# Patient Record
Sex: MALE | Race: WHITE | NOT HISPANIC OR LATINO | URBAN - METROPOLITAN AREA
[De-identification: names, ages, dates, MRNs, and addresses within clinical notes are randomized per-mention and may not be internally consistent; named-entity substitution may affect disease eponyms.]

---

## 2017-03-13 ENCOUNTER — IMPORTED ENCOUNTER (OUTPATIENT)
Dept: URBAN - METROPOLITAN AREA CLINIC 1 | Facility: CLINIC | Age: 62
End: 2017-03-13

## 2017-03-13 PROBLEM — H40.013: Noted: 2017-03-13

## 2017-03-13 PROBLEM — H25.13: Noted: 2017-03-13

## 2017-03-13 PROBLEM — H43.393: Noted: 2017-03-13

## 2017-03-13 PROCEDURE — 92015 DETERMINE REFRACTIVE STATE: CPT

## 2017-03-13 PROCEDURE — 92014 COMPRE OPH EXAM EST PT 1/>: CPT

## 2017-03-13 PROCEDURE — 92133 CPTRZD OPH DX IMG PST SGM ON: CPT

## 2017-03-13 NOTE — PATIENT DISCUSSION
1. COAG Suspect OU: OHTN.  (0.40/0.30)  IOP was 18 OU.  fm HX (dad). Condition was discussed with patient. Will monitor patient for progression. OCT was done today results was normal OU 2. Cataract OU: Observe for now without intervention. The patient was advised to contact us if any change or worsening of vision3. Floaters OU-RD precautions 4. Return for an appointment in 1 year for 30. with Dr. Joni Schaumann.

## 2018-03-14 ENCOUNTER — IMPORTED ENCOUNTER (OUTPATIENT)
Dept: URBAN - METROPOLITAN AREA CLINIC 1 | Facility: CLINIC | Age: 63
End: 2018-03-14

## 2018-03-14 PROBLEM — H40.053: Noted: 2018-03-14

## 2018-03-14 PROBLEM — H43.393: Noted: 2018-03-14

## 2018-03-14 PROBLEM — H25.13: Noted: 2018-03-14

## 2018-03-14 PROBLEM — H40.013: Noted: 2018-03-14

## 2018-03-14 PROCEDURE — 92014 COMPRE OPH EXAM EST PT 1/>: CPT

## 2018-03-14 PROCEDURE — 92015 DETERMINE REFRACTIVE STATE: CPT

## 2018-03-14 NOTE — PATIENT DISCUSSION
1.  Cataract OU: Observe for now without intervention. The patient was advised to contact us if any change or worsening of vision2. Glaucoma Suspect OU/OHTN (CD 0.40/0.30) : IOP 18 OU. family hx (father). Patient is considered Low Risk. 3. Floaters OU-stable MRX for glasses givenReturn for an appointment in 1 year 27 with Dr. Juan Christopher.

## 2019-01-21 ENCOUNTER — IMPORTED ENCOUNTER (OUTPATIENT)
Dept: URBAN - METROPOLITAN AREA CLINIC 1 | Facility: CLINIC | Age: 64
End: 2019-01-21

## 2019-01-21 PROBLEM — H40.053: Noted: 2019-01-21

## 2019-01-21 PROBLEM — H40.013: Noted: 2019-01-21

## 2019-01-21 PROBLEM — H25.813: Noted: 2019-01-21

## 2019-01-21 PROCEDURE — 92015 DETERMINE REFRACTIVE STATE: CPT

## 2019-01-21 PROCEDURE — 92014 COMPRE OPH EXAM EST PT 1/>: CPT

## 2019-01-21 NOTE — PATIENT DISCUSSION
1.  Glaucoma Suspect/OHTN OU (0.4/0.3/ () FHX- Father): Stable IOP OU off drops. Past OCT normal OU. Patient is considered Low Risk. Condition was discussed with patient and patient understands. Will continue to monitor patient for any progression in condition. Patient was advised to call us with any problems questions or concerns. 2.  Cataract OU: Observe for now without intervention. The patient was advised to contact us if any change or worsening of vision3. Return for an appointment for 30/OCT in 1 year with Dr. Abebe Holman.

## 2020-01-21 ENCOUNTER — IMPORTED ENCOUNTER (OUTPATIENT)
Dept: URBAN - METROPOLITAN AREA CLINIC 1 | Facility: CLINIC | Age: 65
End: 2020-01-21

## 2020-01-21 PROBLEM — H40.013: Noted: 2020-01-21

## 2020-01-21 PROBLEM — H43.393: Noted: 2020-01-21

## 2020-01-21 PROBLEM — H25.813: Noted: 2020-01-21

## 2020-01-21 PROCEDURE — 92133 CPTRZD OPH DX IMG PST SGM ON: CPT

## 2020-01-21 PROCEDURE — 92014 COMPRE OPH EXAM EST PT 1/>: CPT

## 2020-01-21 NOTE — PATIENT DISCUSSION
1.  Glaucoma Suspect OU (CD 0.40/0.30): OCT remains WNL OU. IOP stable. Family hx. Patient is considered Low Risk. Condition was discussed with patient and patient understands. Will continue to monitor patient for any progression in condition. Patient was advised to call us with any problems questions or concerns. 2.  Cataract OU: Observe for now without intervention. The patient was advised to contact us if any change or worsening of vision3. Floaters OU- ObserveReturn for an appointment in 1 year 30/glare with Dr. Emi Nobles.

## 2020-07-31 ENCOUNTER — IMPORTED ENCOUNTER (OUTPATIENT)
Dept: URBAN - METROPOLITAN AREA CLINIC 1 | Facility: CLINIC | Age: 65
End: 2020-07-31

## 2020-07-31 PROBLEM — H43.391: Noted: 2020-07-31

## 2020-07-31 PROBLEM — H25.813: Noted: 2020-07-31

## 2020-07-31 PROBLEM — H43.812: Noted: 2020-07-31

## 2020-07-31 PROCEDURE — 92012 INTRM OPH EXAM EST PATIENT: CPT

## 2020-07-31 PROCEDURE — 92015 DETERMINE REFRACTIVE STATE: CPT

## 2020-07-31 NOTE — PATIENT DISCUSSION
1.  Cataract OU -- Visually Significant secondary to Glare OU discussed the risks benefits alternatives and limitations of cataract surgery. The patient stated a full understanding and a desire to proceed with the procedure. The patient will need to return for preop appointment with cataract measurements and to have any additional questions answered and start pre-operative eye drops as directed. Phaco PCL OS then OD Otherwise follow-up2. PVD w/o Tear OS -- RD precautions. 3.  Floater OD -- RD Precautions. 4.  Glaucoma Suspect OU (CD 0.40/0.30) Previous w/u negative. IOP stable. Family hx. Patient is considered Low Risk. Cont to observe. Return for an appointment in [address] [place] [address]  with Dr. Benedicto Cristobal.

## 2020-08-28 ENCOUNTER — IMPORTED ENCOUNTER (OUTPATIENT)
Dept: URBAN - METROPOLITAN AREA CLINIC 1 | Facility: CLINIC | Age: 65
End: 2020-08-28

## 2020-08-28 PROBLEM — H25.812: Noted: 2020-08-28

## 2020-08-28 PROCEDURE — 92136 OPHTHALMIC BIOMETRY: CPT

## 2020-08-28 NOTE — PATIENT DISCUSSION
1. Cataract OS:  Visually Significant secondary to glare discussed the risks benefits alternatives and limitations of cataract surgery. The patient stated a full understanding and a desire to proceed with the procedure. Discussed with patient if PO Gtts are more than $120 for all three combined when filling at their Pharmacy please call our office to request generic substitutions. Pt understands they will need glasses post-op to achieve their best corrected vision. Phaco PCL OS  Lifestyle Questionnaire Completed. Return for an appointment for Return as scheduled with Dr. Faustino Spence.

## 2020-09-18 ENCOUNTER — IMPORTED ENCOUNTER (OUTPATIENT)
Dept: URBAN - METROPOLITAN AREA CLINIC 1 | Facility: CLINIC | Age: 65
End: 2020-09-18

## 2020-09-19 ENCOUNTER — IMPORTED ENCOUNTER (OUTPATIENT)
Dept: URBAN - METROPOLITAN AREA CLINIC 1 | Facility: CLINIC | Age: 65
End: 2020-09-19

## 2020-09-19 PROBLEM — Z96.1: Noted: 2020-09-19

## 2020-09-19 PROCEDURE — 99024 POSTOP FOLLOW-UP VISIT: CPT

## 2020-09-19 NOTE — PATIENT DISCUSSION
POD#1 CE/IOL Standard OS doing well. Use Lotemax BID OS Prolensa Qdaily OS Ocuflox TID OS : Use all three gtts through completion of PO gtt chart regimen/ Per our instructions given to patient.   Post op Warnings Reiterated RTC as scheduled

## 2020-09-29 ENCOUNTER — IMPORTED ENCOUNTER (OUTPATIENT)
Dept: URBAN - METROPOLITAN AREA CLINIC 1 | Facility: CLINIC | Age: 65
End: 2020-09-29

## 2020-09-29 PROBLEM — H25.811: Noted: 2020-09-29

## 2020-09-29 PROCEDURE — 92136 OPHTHALMIC BIOMETRY: CPT

## 2020-09-29 NOTE — PATIENT DISCUSSION
1.  Cataract OD: Visually Significant secondary to glare discussed the risks benefits alternatives and limitations of cataract surgery. The patient stated a full understanding and a desire to proceed with the procedure. Discussed with patient if PO Gtts are more than $120 for all three combined when filling at their Pharmacy please call our office to request generic substitutions. Pt understands they will need glasses post-op to achieve their best corrected vision. Phaco PCL OD 2. POW#3  CE/IOL OS (Standard) doing well. Use Lotemax BID OS and Prolensa Qdaily OS: Use gtts through completion of PO gtt regimen.  F/u as scheduled 2nd eye

## 2020-10-16 ENCOUNTER — IMPORTED ENCOUNTER (OUTPATIENT)
Dept: URBAN - METROPOLITAN AREA CLINIC 1 | Facility: CLINIC | Age: 65
End: 2020-10-16

## 2020-10-17 ENCOUNTER — IMPORTED ENCOUNTER (OUTPATIENT)
Dept: URBAN - METROPOLITAN AREA CLINIC 1 | Facility: CLINIC | Age: 65
End: 2020-10-17

## 2020-10-17 PROBLEM — Z96.1: Noted: 2020-10-17

## 2020-10-17 PROCEDURE — 99024 POSTOP FOLLOW-UP VISIT: CPT

## 2020-10-17 NOTE — PATIENT DISCUSSION
1. POD#1 Phaco/ PCL Standard OD- doing well. Use Lotemax BID OD Prolensa Qdaily OD Ocuflox TID OD : Use all three gtts through completion of PO gtt chart regimen/ Per our instructions given. Post op Warnings Reiterated 2.  POW#3 Phaco/ PCL Standard OS- doing well RTC as scheduled

## 2020-11-09 ENCOUNTER — IMPORTED ENCOUNTER (OUTPATIENT)
Dept: URBAN - METROPOLITAN AREA CLINIC 1 | Facility: CLINIC | Age: 65
End: 2020-11-09

## 2020-11-09 PROBLEM — Z09: Noted: 2020-11-09

## 2020-11-09 PROCEDURE — 99024 POSTOP FOLLOW-UP VISIT: CPT

## 2020-11-09 NOTE — PATIENT DISCUSSION
POW#3 Phaco/ PCL OU (Standard OU) - doing well. Finish PO meds per PO gtt schedule MRX for glasses givenReturn for an appointment in 6 months 30/OCT with Dr. Maggie Gonzalez.

## 2021-06-15 ENCOUNTER — IMPORTED ENCOUNTER (OUTPATIENT)
Dept: URBAN - METROPOLITAN AREA CLINIC 1 | Facility: CLINIC | Age: 66
End: 2021-06-15

## 2021-06-15 PROBLEM — H40.013: Noted: 2021-06-15

## 2021-06-15 PROBLEM — H43.391: Noted: 2021-06-15

## 2021-06-15 PROBLEM — H43.812: Noted: 2021-06-15

## 2021-06-15 PROBLEM — Z96.1: Noted: 2021-06-15

## 2021-06-15 PROCEDURE — 92133 CPTRZD OPH DX IMG PST SGM ON: CPT

## 2021-06-15 PROCEDURE — 99214 OFFICE O/P EST MOD 30 MIN: CPT

## 2021-06-15 NOTE — PATIENT DISCUSSION
1.  Glaucoma Suspect OU (CD 0.55/0.30): OCT remains WNL OU. IOP stable. Family hx. Patient is considered Low Risk. Condition was discussed with patient and patient understands. Will continue to monitor patient for any progression in condition. Patient was advised to call us with any problems questions or concerns. 2.  Pseudophakia OU - (Standard OU) 3. PVD w/o Tear OS- RD precautions. 4.  Floater OD- RD precautions 5. 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 Reassurance given  Patient deferred [facility] Rx today. Return for an appointment in 1 year 27 with Dr. Benedicto Cristobal.
Floater OD- RD precautions
PVD w/o Tear OS- RD precautions.
Pseudophakia OU - (Standard OU)
[Negative] : Genitourinary

## 2022-02-18 ENCOUNTER — CONTACT LENSES/GLASSES VISIT (OUTPATIENT)
Dept: URBAN - METROPOLITAN AREA CLINIC 1 | Facility: CLINIC | Age: 67
End: 2022-02-18

## 2022-02-18 DIAGNOSIS — H52.7: ICD-10-CM

## 2022-02-18 PROCEDURE — 92015 DETERMINE REFRACTIVE STATE: CPT

## 2022-02-18 ASSESSMENT — VISUAL ACUITY
OS_SC: 20/25
OD_SC: 20/25

## 2022-03-11 ASSESSMENT — VISUAL ACUITY
OD_GLARE: 20/40
OD_CC: J1+
OD_SC: 20/40
OD_SC: 20/20
OS_CC: 20/20
OS_GLARE: 20/60
OD_SC: 20/20
OS_CC: J1+
OD_CC: J1+
OS_CC: J1+
OD_CC: 20/20
OD_CC: 20/20
OD_CC: 20/60
OD_GLARE: 20/60
OS_SC: 20/20
OD_GLARE: 20/60
OD_CC: J1+
OS_SC: 20/20
OS_SC: 20/25
OD_SC: 20/40
OS_GLARE: 20/50
OD_SC: 20/30-1
OS_CC: 20/20
OS_GLARE: 20/40
OD_SC: 20/20
OS_SC: 20/20
OS_SC: 20/25
OS_CC: 20/40
OD_GLARE: 20/50
OS_CC: 20/20
OS_GLARE: 20/60
OD_CC: 20/25
OS_CC: J1+
OS_SC: 20/60
OS_CC: 20/20
OS_CC: 20/20

## 2022-03-11 ASSESSMENT — TONOMETRY
OS_IOP_MMHG: 14
OD_IOP_MMHG: 17
OS_IOP_MMHG: 16
OD_IOP_MMHG: 18
OS_IOP_MMHG: 14
OS_IOP_MMHG: 17
OD_IOP_MMHG: 14
OD_IOP_MMHG: 18
OD_IOP_MMHG: 18
OD_IOP_MMHG: 19
OS_IOP_MMHG: 18
OD_IOP_MMHG: 15
OS_IOP_MMHG: 18
OD_IOP_MMHG: 18
OS_IOP_MMHG: 18
OS_IOP_MMHG: 18

## 2022-03-11 ASSESSMENT — KERATOMETRY
OD_AXISANGLE_DEGREES: 178
OD_K2POWER_DIOPTERS: 43.50
OS_AXISANGLE2_DEGREES: 104
OS_AXISANGLE_DEGREES: 014
OD_K1POWER_DIOPTERS: 42.75
OD_AXISANGLE2_DEGREES: 088
OS_K1POWER_DIOPTERS: 42.75
OS_K2POWER_DIOPTERS: 42.00

## 2022-12-13 ENCOUNTER — COMPREHENSIVE EXAM (OUTPATIENT)
Dept: URBAN - METROPOLITAN AREA CLINIC 1 | Facility: CLINIC | Age: 67
End: 2022-12-13

## 2022-12-13 PROCEDURE — 99214 OFFICE O/P EST MOD 30 MIN: CPT

## 2022-12-13 PROCEDURE — 92015 DETERMINE REFRACTIVE STATE: CPT

## 2022-12-13 PROCEDURE — 92133 CPTRZD OPH DX IMG PST SGM ON: CPT

## 2022-12-13 ASSESSMENT — TONOMETRY
OS_IOP_MMHG: 15
OD_IOP_MMHG: 15

## 2022-12-13 ASSESSMENT — VISUAL ACUITY
OD_CC: 20/20
OS_CC: J1+
OD_CC: J1+
OS_CC: 20/20-1

## 2022-12-13 NOTE — PATIENT DISCUSSION
(CD 0.55/0.30) IOP stable at 15 OU today. OCT performed today WNL OU. Will perform HVF next year. (+) Family hx. Patient is considered low risk. Condition was discussed with patient and patient understands. Will continue to monitor patient for any progression in condition. Patient was advised to call us with any problems, questions, or concerns.

## 2023-12-13 ENCOUNTER — COMPREHENSIVE EXAM (OUTPATIENT)
Dept: URBAN - METROPOLITAN AREA CLINIC 1 | Facility: CLINIC | Age: 68
End: 2023-12-13

## 2023-12-13 DIAGNOSIS — H43.391: ICD-10-CM

## 2023-12-13 DIAGNOSIS — H43.393: ICD-10-CM

## 2023-12-13 DIAGNOSIS — H40.013: ICD-10-CM

## 2023-12-13 DIAGNOSIS — Z96.1: ICD-10-CM

## 2023-12-13 DIAGNOSIS — H43.812: ICD-10-CM

## 2023-12-13 PROCEDURE — 99214 OFFICE O/P EST MOD 30 MIN: CPT

## 2023-12-13 PROCEDURE — 92083 EXTENDED VISUAL FIELD XM: CPT

## 2023-12-13 ASSESSMENT — VISUAL ACUITY
OD_CC: J1+
OS_CC: J1+
OS_CC: 20/20
OD_CC: 20/20

## 2023-12-13 ASSESSMENT — TONOMETRY
OS_IOP_MMHG: 14
OD_IOP_MMHG: 14

## 2024-12-18 ENCOUNTER — COMPREHENSIVE EXAM (OUTPATIENT)
Age: 69
End: 2024-12-18

## 2024-12-18 DIAGNOSIS — H26.493: ICD-10-CM

## 2024-12-18 DIAGNOSIS — H43.391: ICD-10-CM

## 2024-12-18 DIAGNOSIS — H40.013: ICD-10-CM

## 2024-12-18 DIAGNOSIS — H43.812: ICD-10-CM

## 2024-12-18 PROCEDURE — 99214 OFFICE O/P EST MOD 30 MIN: CPT

## 2024-12-18 PROCEDURE — 92133 CPTRZD OPH DX IMG PST SGM ON: CPT

## 2024-12-18 PROCEDURE — 92015 DETERMINE REFRACTIVE STATE: CPT
